# Patient Record
Sex: FEMALE | Race: BLACK OR AFRICAN AMERICAN | NOT HISPANIC OR LATINO | Employment: OTHER | URBAN - METROPOLITAN AREA
[De-identification: names, ages, dates, MRNs, and addresses within clinical notes are randomized per-mention and may not be internally consistent; named-entity substitution may affect disease eponyms.]

---

## 2019-06-14 PROBLEM — E78.5 HYPERLIPIDEMIA: Status: ACTIVE | Noted: 2019-06-14

## 2019-06-14 PROBLEM — N83.8 ENLARGED OVARY: Status: ACTIVE | Noted: 2019-06-14

## 2019-06-14 PROBLEM — M25.50 ARTHRALGIA: Status: ACTIVE | Noted: 2019-06-14

## 2019-06-14 PROBLEM — M85.80 OSTEOPENIA: Status: ACTIVE | Noted: 2019-06-14

## 2019-08-12 PROBLEM — Z00.00 ENCOUNTER FOR PREVENTIVE CARE: Status: ACTIVE | Noted: 2019-08-12

## 2019-08-12 PROBLEM — M85.80 OSTEOPENIA: Status: RESOLVED | Noted: 2019-06-14 | Resolved: 2019-08-12

## 2019-08-12 PROBLEM — I10 HTN (HYPERTENSION): Status: ACTIVE | Noted: 2019-08-12

## 2019-08-12 PROBLEM — R27.0 ATAXIA: Status: ACTIVE | Noted: 2019-08-12

## 2019-08-12 PROBLEM — R29.818 NEUROLOGICAL DEFICIT PRESENT: Status: ACTIVE | Noted: 2019-08-12

## 2019-10-22 PROBLEM — G89.29 CHRONIC PAIN: Status: ACTIVE | Noted: 2019-10-22

## 2019-11-11 PROBLEM — Z00.00 ENCOUNTER FOR PREVENTIVE CARE: Status: RESOLVED | Noted: 2019-08-12 | Resolved: 2019-11-11

## 2020-06-19 PROBLEM — K05.30 PERIODONTITIS: Status: ACTIVE | Noted: 2020-06-19

## 2022-04-06 PROBLEM — N93.9 VAGINAL SPOTTING: Status: ACTIVE | Noted: 2022-04-06

## 2023-11-17 ENCOUNTER — PATIENT OUTREACH (OUTPATIENT)
Dept: ADMINISTRATIVE | Facility: HOSPITAL | Age: 68
End: 2023-11-17

## 2023-11-21 ENCOUNTER — PATIENT OUTREACH (OUTPATIENT)
Dept: ADMINISTRATIVE | Facility: HOSPITAL | Age: 68
End: 2023-11-21

## 2024-02-05 ENCOUNTER — PATIENT OUTREACH (OUTPATIENT)
Dept: ADMINISTRATIVE | Facility: HOSPITAL | Age: 69
End: 2024-02-05

## 2024-05-21 ENCOUNTER — PATIENT OUTREACH (OUTPATIENT)
Dept: ADMINISTRATIVE | Facility: HOSPITAL | Age: 69
End: 2024-05-21

## 2024-06-20 DIAGNOSIS — I10 ESSENTIAL HYPERTENSION: ICD-10-CM

## 2024-06-20 RX ORDER — AMLODIPINE BESYLATE 5 MG/1
5 TABLET ORAL
Qty: 90 TABLET | Refills: 3 | Status: SHIPPED | OUTPATIENT
Start: 2024-06-20

## 2024-06-20 NOTE — TELEPHONE ENCOUNTER
Care Due:                  Date            Visit Type   Department     Provider  --------------------------------------------------------------------------------                                EP -                              PRIMARY  Last Visit: 05-      CARE ([facility])   None Found     Danae Alejandroput  Next Visit: None Scheduled  None         None Found                                                            Last  Test          Frequency    Reason                     Performed    Due Date  --------------------------------------------------------------------------------    CBC.........  12 months..  ibuprofen................  01-   01-    CMP.........  12 months..  atorvastatin,              01-   01-                             ergocalciferol,                             ibuprofen, lisinopriL....    Lipid Panel.  12 months..  atorvastatin.............  01-   01-    Vitamin D...  12 months..  ergocalciferol...........  01-   01-    [facility] Embedded Care Due Messages. Reference number: [id number].   6/20/2024 12:18:49 PM CDT

## 2024-06-20 NOTE — TELEPHONE ENCOUNTER
Refill Routing Note   Medication(s) are not appropriate for processing by [facility] for the following reason(s):        Required vitals abnormal  New or recently adjusted medication    [facility] action(s):  Defer   Requires labs : Yes             Appointments  past 12m or future 3m with PCP    Date Provider   Last Visit   5/20/2024 Danae Heath MD   Next Visit   7/22/2024 Danae Heath MD   ED visits in past 90 days: 0        Note composed:12:40 PM 06/20/2024

## 2024-07-01 ENCOUNTER — PATIENT OUTREACH (OUTPATIENT)
Dept: ADMINISTRATIVE | Facility: HOSPITAL | Age: 69
End: 2024-07-01